# Patient Record
(demographics unavailable — no encounter records)

---

## 2024-11-18 NOTE — DISCUSSION/SUMMARY
[FreeTextEntry1] : Cont ASA Cont Lipitor Cont Toprol and Cardizem Cont Lasix Cont Candesartan-HCTZ and Norvasc ECHO to reevaluate AS Pulmonary follow-up / CPAP and O2 compliance Follow-up 3-months [EKG obtained to assist in diagnosis and management of assessed problem(s)] : EKG obtained to assist in diagnosis and management of assessed problem(s)

## 2024-11-18 NOTE — REASON FOR VISIT
[FreeTextEntry1] : Exertional dyspnea worse.  Notices difference compared to last year.  Symptoms with short walks between rooms and home.  Does not use CPAP.  Uses oxygen as needed, but sounds like she really needs it with all activity.  Resting SaO2s normal.  Weight stable.  Labs reviewed (3/2024): Creatinine 1.3 CBC, CMP otherwise unremarkable

## 2024-11-18 NOTE — ASSESSMENT
[FreeTextEntry1] : Rate controlled. Prior bradycardia improved (Toprol decreased). [id number] = (6).  High bleeding risk (recurrent GIB) s/p WATCHMAN.  Moderate AS Chronic Diastolic CHF Relatively compensated  Dyspnea likely multifactorial (COPD contributor)  BP controlled. Unilateral renal stenosis (10/2019 US). Cr stable.  Moderate CAD. Mild CON  PAD s/p LE angioplasty / stenting. No clear claudication.  Symptoms more consistent with neuropathy.

## 2024-12-18 NOTE — ASSESSMENT
[FreeTextEntry1] : Moderate COPD.  On Breztri. YURIY on CPAP. SP new machine delivery from the recall.  Not compliant with PAP therapy  HAZEL lung nodule enlarging in size Other stable to improving lung nodules

## 2024-12-18 NOTE — HISTORY OF PRESENT ILLNESS
[Doing Poorly] : doing poorly [Difficulty Breathing During Exertion] : worsened dyspnea on exertion [Feelings Of Weakness On Exertion] : worsened exercise intolerance [Wheezing] : worsened wheezing [Adherent] : the patient is adherent with ~his/her~ medication regimen [Goals--Doing Well] : the patient is doing well with ~his/her~ COPD goals [PFTs] : pulmonary function tests [Follow-Up - Routine Clinic] : a routine clinic follow-up of [None] : No associated symptoms are reported [CPAP] : CPAP [Good Compliance] : good compliance with treatment [Good Tolerance] : good tolerance of treatment [Good Symptom Control] : good symptom control

## 2025-01-07 NOTE — ASSESSMENT
[FreeTextEntry1] : 87 year-old female with alia pulmonary nodule.  Plan: Discussed options-watching, vs biopsy, vs surgical resection (unlikely given her age and comorbidities)  She would like to pursue biopsy  Risks, benefits, and alternatives were discussed at length with the patient and her son including but not limited to ptx requiring chest tube placement, hospitalization, and thoracic surgery intervention, hemoptysis, and hemothorax.  
Decreased sensation of distal lower extremities

## 2025-01-07 NOTE — HISTORY OF PRESENT ILLNESS
[FreeTextEntry1] : 87 year-old female with history of smoking and growing left upper lobe pulmonary nodule.  Here today to discuss biopsy.  We discussed risk of pneumothorax and undersampling with the patient and her son and answered all questions.  At this point she would like to go ahead with the procedure and would want treatment if it turns out to be cancer.

## 2025-01-29 NOTE — ASSESSMENT
[FreeTextEntry1] : Moderate COPD.  On Breztri. YURIY on CPAP. SP new machine delivery from the recall.  Not compliant with PAP therapy  HAZEL lung nodule enlarging in size.  PET negative.  CT guided FNA.  Adenocarcinoma  Other stable to improving lung nodules

## 2025-02-16 NOTE — DISEASE MANAGEMENT
[FreeTextEntry4] : non-small cell carcinoma, adenocarcinoma of the left lung [TTNM] : 1b [NTNM] : 0 [MTNM] : 0 [de-identified] : left lung, upper lobe

## 2025-02-16 NOTE — LETTER CLOSING
[Consult Closing:] : Thank you for allowing me to participate in the care of this patient.  If you have any questions, please do not hesitate to contact me. [Sincerely yours,] : Sincerely yours, [FreeTextEntry3] : Michell Owens M.D.   Electronically proofread and signed by:  Michell Owens MD Attending, Department of Radiation Medicine [facility]

## 2025-02-16 NOTE — PHYSICAL EXAM
[Heart Rate And Rhythm] : heart rate and rhythm were normal [Heart Sounds] : normal S1 and S2 [Nondistended] : nondistended [Abdomen Tenderness] : non-tender [Normal] : no palpable adenopathy [Distress] : no respiratory distress

## 2025-02-16 NOTE — HISTORY OF PRESENT ILLNESS
[FreeTextEntry1] : Patient is an 87-year-old woman with known history of YURIY, non- complaint with CPAP, on home supplemental oxygen, PRN 2L Nasal canula, and pulmonary nodules managed by Dr. Nowak. On routine surveillance, CT chest on 11/4/2024 showed since October 12, 2023, enlarging 11 mm solid nodule left upper lobe, previously 5 mm.  Stable 15 x 8 mm subpleural nodule, posterior left lower lobe.  Previous 8 mm nodule right middle lobe currently measures 6 mm.  Additional scattered micronodules seen best on inverted MIP imaging. Recommendation: PET scan to evaluate enlarging left upper lobe nodule.  On 12/27/2024 PET CT, comparison CT chest 11/4/2024 shows, area of interest, left upper lobe 1.1 cm solid pulmonary nodule is non-FDG avid without visual activity on nonattenuation corrected. Follow-up CT chest in 6 months recommended to determine stability or resolution. Additional bilateral pulmonary nodules are also non-FDG avid, including left lower lobe 1.3 cm subpleural nodule. No sites of pathologic FDG uptake.  On 1/14/2025, she underwent CT guided biopsy of the left upper lobe pulmonary nodule and pathology shows, core fragments of non-small cell carcinoma. Adenocarcinoma with predominantly lepidic and rare acinor growth pattern.   She is here with her sons to discuss radiation therapy.    PFTs from 12/14/2023 show, FEV1- 51%, TLC 46 &, DLCO 43%. indicating there is moderate obstructive, mod restrictive, and sever decreased in diffusing capacity.   We discussed obtaining a more recent PFT.

## 2025-02-16 NOTE — DISEASE MANAGEMENT
[FreeTextEntry4] : non-small cell carcinoma, adenocarcinoma of the left lung [TTNM] : 1b [NTNM] : 0 [MTNM] : 0 [de-identified] : left lung, upper lobe

## 2025-02-16 NOTE — REVIEW OF SYSTEMS
[Patient Intake Form Reviewed] : Patient intake form was reviewed [Chest Pain] : no chest pain [Shortness Of Breath] : no shortness of breath [Confused] : no confusion [FreeTextEntry6] : with min exertion

## 2025-02-16 NOTE — LETTER CLOSING
[Consult Closing:] : Thank you for allowing me to participate in the care of this patient.  If you have any questions, please do not hesitate to contact me. [Sincerely yours,] : Sincerely yours, [FreeTextEntry3] : Michell Owens M.D.   Electronically proofread and signed by:  Michell Owens MD Attending, Department of Radiation Medicine [facility]

## 2025-03-03 NOTE — REASON FOR VISIT
[FreeTextEntry1] : Interval diagnosis of localized lung adenocarcinoma.  Surgery deemed medically prohibitive.  Plan for radiation therapy.  Stable exertional dyspnea.  Weight stable.  ECHO reviewed.  Normal LVSF.  Moderate to severe PLFLG AS.

## 2025-03-03 NOTE — DISCUSSION/SUMMARY
[FreeTextEntry1] : Cont ASA Cont Lipitor Cont Toprol and Cardizem Cont Lasix Cont Candesartan-HCTZ and Norvasc Pulmonary follow-up / CPAP and O2 compliance Follow-up 4-months  Clinical monitoring at present given age, comorbidities, and borderline PLFPG AS without clear symptoms (COPD likely greatest contributor to dyspnea).  Will readdress after radiation therapy. [EKG obtained to assist in diagnosis and management of assessed problem(s)] : EKG obtained to assist in diagnosis and management of assessed problem(s)

## 2025-03-03 NOTE — ASSESSMENT
[FreeTextEntry1] : Rate controlled. Prior bradycardia improved (Toprol decreased). [id number] = (6).  High bleeding risk (recurrent GIB) s/p WATCHMAN.  Moderate to severe AS (PLFLG) Chronic Diastolic CHF Relatively compensated  Dyspnea likely multifactorial (COPD contributor)  BP controlled. Unilateral renal stenosis (10/2019 US). Cr stable.  Moderate CAD. Mild CON  PAD s/p LE angioplasty / stenting. No clear claudication.  Symptoms more consistent with neuropathy.

## 2025-03-17 NOTE — PHYSICAL EXAM
[Normal Rate] : the respiratory rate was normal [Rales / Crackles Bilateral] : no rales or crackles were heard [Wheezing Bilaterally] : no wheezing was heard [Rhonchi Bilateral] : no rhonchi were heard [Nail Clubbing] : no clubbing  or cyanosis of the fingernails [No Focal Deficits] : no focal deficits [Normal] : oriented to person, place and time, the affect was normal, the mood was normal and not anxious

## 2025-03-17 NOTE — REVIEW OF SYSTEMS
[Fatigue: Grade 1 - Fatigue relieved by rest] : Fatigue: Grade 1 - Fatigue relieved by rest [Cough: Grade 0] : Cough: Grade 0 [Dyspnea: Grade 1 - Shortness of breath with moderate exertion] : Dyspnea: Grade 1 - Shortness of breath with moderate exertion [Hypoxia: Grade 0] : Hypoxia: Grade 0 [Pruritus: Grade 0] : Pruritus: Grade 0 [Dermatitis Radiation: Grade 1 - Faint erythema or dry desquamation] : Dermatitis Radiation: Grade 1 - Faint erythema or dry desquamation

## 2025-04-26 NOTE — DISEASE MANAGEMENT
[Clinical] : TNM Stage: c [IA2] : IA2 [de-identified] : 5000cGy [de-identified] : 5000cGy [FreeTextEntry4] : non-small cell carcinoma, adenocarcinoma of the left lung [TTNM] : 1b [NTNM] : 0 [MTNM] : 0 [de-identified] : left lung, upper lobe

## 2025-04-26 NOTE — HISTORY OF PRESENT ILLNESS
[FreeTextEntry1] : JUAN ZAYAS returns to clinic in follow up visit.  As you know, JUAN ZAYAS is an 87-year-old female with non-small cell carcinoma, adenocarcinoma of the left lung. TNM Stage: c T 1b N 0 M 0, AJCC Stage (8th Ed): IA2. She is medically inoperable. The patient was treated to the involved left upper lobe using a Stereotactic Body Radiation Therapy (SBRT) technique.  The patient tolerated treatments quite well.  The patient received 5,000 cGy to the left lung, UL from 3/11/2025 through 3/21/2025.  I last saw her in March 2025.    In the interim, the patient has done well.  She reports no changes in breathing, will use supplementally home oxygen PRN, currently on r/a.  She has intermittent non- productive cough. Otherwise, no new issues.  We discussed upcoming images, PET CT Juen- July 2025. Patient is accompanied by her son.    Upcoming appointments:  Dr. Nowak 4/30/2025 Dr. Mendoza 9/8/2025

## 2025-04-26 NOTE — DISEASE MANAGEMENT
[Clinical] : TNM Stage: c [IA2] : IA2 [de-identified] : 5000cGy [de-identified] : 5000cGy [FreeTextEntry4] : non-small cell carcinoma, adenocarcinoma of the left lung [TTNM] : 1b [NTNM] : 0 [MTNM] : 0 [de-identified] : left lung, upper lobe

## 2025-04-26 NOTE — DISEASE MANAGEMENT
[Clinical] : TNM Stage: c [IA2] : IA2 [de-identified] : 5000cGy [de-identified] : 5000cGy [FreeTextEntry4] : non-small cell carcinoma, adenocarcinoma of the left lung [TTNM] : 1b [NTNM] : 0 [MTNM] : 0 [de-identified] : left lung, upper lobe

## 2025-04-26 NOTE — REVIEW OF SYSTEMS
[Fatigue] : fatigue [Cough] : cough [Joint Pain] : joint pain [Muscle Pain] : muscle pain [Negative] : Psychiatric [Fever] : no fever [Chills] : no chills [Chest Pain] : no chest pain [Shortness Of Breath] : no shortness of breath [Confused] : no confusion

## 2025-04-26 NOTE — PHYSICAL EXAM
[Sclera] : the sclera and conjunctiva were normal [] : no respiratory distress [Respiration, Rhythm And Depth] : normal respiratory rhythm and effort [Exaggerated Use Of Accessory Muscles For Inspiration] : no accessory muscle use [Auscultation Breath Sounds / Voice Sounds] : lungs were clear to auscultation bilaterally [Rales / Crackles Bilateral] : no rales or crackles were heard [Rhonchi Bilateral] : no rhonchi were heard [Heart Rate And Rhythm] : heart rate and rhythm were normal [Heart Sounds] : normal S1 and S2 [Edema] : no peripheral edema present [No Focal Deficits] : no focal deficits [Nondistended] : nondistended [Abdomen Tenderness] : non-tender [Normal] : no palpable adenopathy [de-identified] : ERLINDA

## 2025-04-26 NOTE — LETTER CLOSING
[Consult Closing:] : Thank you for allowing me to participate in the care of this patient.  If you have any questions, please do not hesitate to contact me. [Sincerely yours,] : Sincerely yours, [FreeTextEntry3] : Michell Owens M.D.   Electronically proofread and signed by:  Michell Owens MD Attending, Department of Radiation Medicine [facility]

## 2025-04-26 NOTE — END OF VISIT
[Time Spent: ___ minutes] : I have spent [unfilled] minutes of time on the encounter which excludes teaching and separately reported services. Awake

## 2025-04-26 NOTE — LETTER CLOSING
[Consult Closing:] : Thank you for allowing me to participate in the care of this patient.  If you have any questions, please do not hesitate to contact me. [Sincerely yours,] : Sincerely yours, [FreeTextEntry3] : Michell Owens M.D.   Electronically proofread and signed by:  Michell Owens MD Attending, Department of Radiation Medicine [facility]

## 2025-04-26 NOTE — PHYSICAL EXAM
[Sclera] : the sclera and conjunctiva were normal [] : no respiratory distress [Respiration, Rhythm And Depth] : normal respiratory rhythm and effort [Exaggerated Use Of Accessory Muscles For Inspiration] : no accessory muscle use [Auscultation Breath Sounds / Voice Sounds] : lungs were clear to auscultation bilaterally [Rales / Crackles Bilateral] : no rales or crackles were heard [Rhonchi Bilateral] : no rhonchi were heard [Heart Rate And Rhythm] : heart rate and rhythm were normal [Heart Sounds] : normal S1 and S2 [Edema] : no peripheral edema present [No Focal Deficits] : no focal deficits [Nondistended] : nondistended [Abdomen Tenderness] : non-tender [Normal] : no palpable adenopathy [de-identified] : ERLINDA

## 2025-04-26 NOTE — VITALS
[70: Cares for self; unalbe to carry on normal activity or do active work.] : 70: Cares for self; unable to carry on normal activity or do active work. [Maximal Pain Intensity: 0/10] : 0/10

## 2025-04-26 NOTE — PHYSICAL EXAM
[Sclera] : the sclera and conjunctiva were normal [] : no respiratory distress [Respiration, Rhythm And Depth] : normal respiratory rhythm and effort [Exaggerated Use Of Accessory Muscles For Inspiration] : no accessory muscle use [Auscultation Breath Sounds / Voice Sounds] : lungs were clear to auscultation bilaterally [Rales / Crackles Bilateral] : no rales or crackles were heard [Rhonchi Bilateral] : no rhonchi were heard [Heart Rate And Rhythm] : heart rate and rhythm were normal [Heart Sounds] : normal S1 and S2 [Edema] : no peripheral edema present [No Focal Deficits] : no focal deficits [Nondistended] : nondistended [Abdomen Tenderness] : non-tender [Normal] : no palpable adenopathy [de-identified] : ERLINDA

## 2025-04-26 NOTE — LETTER CLOSING
[Consult Closing:] : Thank you for allowing me to participate in the care of this patient.  If you have any questions, please do not hesitate to contact me. [Sincerely yours,] : Sincerely yours, [FreeTextEntry3] : Michell Owens M.D.   Electronically proofread and signed by:  Michell Owens MD Attending, Department of Radiation Medicine [facility]

## 2025-07-16 NOTE — ASSESSMENT
[FreeTextEntry1] : Moderate COPD.  On Breztri. YURIY on CPAP. SP new machine delivery from the recall.  Not compliant with PAP therapy  HAZEL lung nodule enlarging in size.  PET negative.  CT guided FNA.  Adenocarcinoma  SP SBRT.  Following with Rad onc  Other stable to improving lung nodules